# Patient Record
Sex: MALE | Race: OTHER | HISPANIC OR LATINO | URBAN - METROPOLITAN AREA
[De-identification: names, ages, dates, MRNs, and addresses within clinical notes are randomized per-mention and may not be internally consistent; named-entity substitution may affect disease eponyms.]

---

## 2018-01-08 ENCOUNTER — EMERGENCY (EMERGENCY)
Facility: HOSPITAL | Age: 43
LOS: 1 days | Discharge: HOME | End: 2018-01-08
Attending: EMERGENCY MEDICINE
Payer: MEDICAID

## 2018-01-08 VITALS
TEMPERATURE: 98 F | HEIGHT: 73 IN | RESPIRATION RATE: 16 BRPM | DIASTOLIC BLOOD PRESSURE: 101 MMHG | SYSTOLIC BLOOD PRESSURE: 183 MMHG | WEIGHT: 240.08 LBS | HEART RATE: 87 BPM | OXYGEN SATURATION: 99 %

## 2018-01-08 VITALS
OXYGEN SATURATION: 98 % | HEART RATE: 78 BPM | SYSTOLIC BLOOD PRESSURE: 170 MMHG | RESPIRATION RATE: 16 BRPM | DIASTOLIC BLOOD PRESSURE: 109 MMHG

## 2018-01-08 DIAGNOSIS — T14.90 INJURY, UNSPECIFIED: Chronic | ICD-10-CM

## 2018-01-08 LAB
ALBUMIN SERPL ELPH-MCNC: 4.1 G/DL — SIGNIFICANT CHANGE UP (ref 3.3–5.2)
ALP SERPL-CCNC: 71 U/L — SIGNIFICANT CHANGE UP (ref 40–120)
ALT FLD-CCNC: 24 U/L — SIGNIFICANT CHANGE UP
ANION GAP SERPL CALC-SCNC: 13 MMOL/L — SIGNIFICANT CHANGE UP (ref 5–17)
APPEARANCE UR: CLEAR — SIGNIFICANT CHANGE UP
AST SERPL-CCNC: 35 U/L — SIGNIFICANT CHANGE UP
BASOPHILS # BLD AUTO: 0 K/UL — SIGNIFICANT CHANGE UP (ref 0–0.2)
BASOPHILS NFR BLD AUTO: 0.4 % — SIGNIFICANT CHANGE UP (ref 0–2)
BILIRUB SERPL-MCNC: 0.8 MG/DL — SIGNIFICANT CHANGE UP (ref 0.4–2)
BILIRUB UR-MCNC: NEGATIVE — SIGNIFICANT CHANGE UP
BUN SERPL-MCNC: 13 MG/DL — SIGNIFICANT CHANGE UP (ref 8–20)
CALCIUM SERPL-MCNC: 9.3 MG/DL — SIGNIFICANT CHANGE UP (ref 8.6–10.2)
CHLORIDE SERPL-SCNC: 102 MMOL/L — SIGNIFICANT CHANGE UP (ref 98–107)
CO2 SERPL-SCNC: 25 MMOL/L — SIGNIFICANT CHANGE UP (ref 22–29)
COLOR SPEC: YELLOW — SIGNIFICANT CHANGE UP
CREAT SERPL-MCNC: 0.88 MG/DL — SIGNIFICANT CHANGE UP (ref 0.5–1.3)
D DIMER BLD IA.RAPID-MCNC: <150 NG/ML DDU — SIGNIFICANT CHANGE UP
DIFF PNL FLD: ABNORMAL
EOSINOPHIL # BLD AUTO: 0.1 K/UL — SIGNIFICANT CHANGE UP (ref 0–0.5)
EOSINOPHIL NFR BLD AUTO: 2.1 % — SIGNIFICANT CHANGE UP (ref 0–5)
GLUCOSE SERPL-MCNC: 109 MG/DL — SIGNIFICANT CHANGE UP (ref 70–115)
GLUCOSE UR QL: NEGATIVE MG/DL — SIGNIFICANT CHANGE UP
HCT VFR BLD CALC: 47 % — SIGNIFICANT CHANGE UP (ref 42–52)
HGB BLD-MCNC: 15.8 G/DL — SIGNIFICANT CHANGE UP (ref 14–18)
KETONES UR-MCNC: NEGATIVE — SIGNIFICANT CHANGE UP
LEUKOCYTE ESTERASE UR-ACNC: NEGATIVE — SIGNIFICANT CHANGE UP
LYMPHOCYTES # BLD AUTO: 2 K/UL — SIGNIFICANT CHANGE UP (ref 1–4.8)
LYMPHOCYTES # BLD AUTO: 30.3 % — SIGNIFICANT CHANGE UP (ref 20–55)
MCHC RBC-ENTMCNC: 31.2 PG — HIGH (ref 27–31)
MCHC RBC-ENTMCNC: 33.6 G/DL — SIGNIFICANT CHANGE UP (ref 32–36)
MCV RBC AUTO: 92.9 FL — SIGNIFICANT CHANGE UP (ref 80–94)
MONOCYTES # BLD AUTO: 0.5 K/UL — SIGNIFICANT CHANGE UP (ref 0–0.8)
MONOCYTES NFR BLD AUTO: 7.3 % — SIGNIFICANT CHANGE UP (ref 3–10)
NEUTROPHILS # BLD AUTO: 4 K/UL — SIGNIFICANT CHANGE UP (ref 1.8–8)
NEUTROPHILS NFR BLD AUTO: 59.8 % — SIGNIFICANT CHANGE UP (ref 37–73)
NITRITE UR-MCNC: NEGATIVE — SIGNIFICANT CHANGE UP
PH UR: 5 — SIGNIFICANT CHANGE UP (ref 5–8)
PLATELET # BLD AUTO: 260 K/UL — SIGNIFICANT CHANGE UP (ref 150–400)
POTASSIUM SERPL-MCNC: 5.5 MMOL/L — HIGH (ref 3.5–5.3)
POTASSIUM SERPL-SCNC: 5.5 MMOL/L — HIGH (ref 3.5–5.3)
PROT SERPL-MCNC: 8.2 G/DL — SIGNIFICANT CHANGE UP (ref 6.6–8.7)
PROT UR-MCNC: 15 MG/DL
RBC # BLD: 5.06 M/UL — SIGNIFICANT CHANGE UP (ref 4.6–6.2)
RBC # FLD: 13.2 % — SIGNIFICANT CHANGE UP (ref 11–15.6)
RBC CASTS # UR COMP ASSIST: SIGNIFICANT CHANGE UP /HPF (ref 0–4)
SODIUM SERPL-SCNC: 140 MMOL/L — SIGNIFICANT CHANGE UP (ref 135–145)
SP GR SPEC: 1.02 — SIGNIFICANT CHANGE UP (ref 1.01–1.02)
UROBILINOGEN FLD QL: NEGATIVE MG/DL — SIGNIFICANT CHANGE UP
WBC # BLD: 6.7 K/UL — SIGNIFICANT CHANGE UP (ref 4.8–10.8)
WBC # FLD AUTO: 6.7 K/UL — SIGNIFICANT CHANGE UP (ref 4.8–10.8)
WBC UR QL: SIGNIFICANT CHANGE UP

## 2018-01-08 PROCEDURE — 99284 EMERGENCY DEPT VISIT MOD MDM: CPT

## 2018-01-08 PROCEDURE — 85379 FIBRIN DEGRADATION QUANT: CPT

## 2018-01-08 PROCEDURE — 36415 COLL VENOUS BLD VENIPUNCTURE: CPT

## 2018-01-08 PROCEDURE — 81001 URINALYSIS AUTO W/SCOPE: CPT

## 2018-01-08 PROCEDURE — 85027 COMPLETE CBC AUTOMATED: CPT

## 2018-01-08 PROCEDURE — 99284 EMERGENCY DEPT VISIT MOD MDM: CPT | Mod: 25

## 2018-01-08 PROCEDURE — 96374 THER/PROPH/DIAG INJ IV PUSH: CPT

## 2018-01-08 PROCEDURE — 80053 COMPREHEN METABOLIC PANEL: CPT

## 2018-01-08 RX ORDER — LOSARTAN POTASSIUM 100 MG/1
25 TABLET, FILM COATED ORAL DAILY
Qty: 0 | Refills: 0 | Status: DISCONTINUED | OUTPATIENT
Start: 2018-01-08 | End: 2018-01-12

## 2018-01-08 RX ORDER — TRAMADOL HYDROCHLORIDE 50 MG/1
1 TABLET ORAL
Qty: 6 | Refills: 0
Start: 2018-01-08 | End: 2018-01-09

## 2018-01-08 RX ORDER — KETOROLAC TROMETHAMINE 30 MG/ML
30 SYRINGE (ML) INJECTION ONCE
Qty: 0 | Refills: 0 | Status: DISCONTINUED | OUTPATIENT
Start: 2018-01-08 | End: 2018-01-08

## 2018-01-08 RX ORDER — LOSARTAN POTASSIUM 100 MG/1
1 TABLET, FILM COATED ORAL
Qty: 30 | Refills: 0
Start: 2018-01-08 | End: 2018-02-06

## 2018-01-08 RX ORDER — IBUPROFEN 200 MG
1 TABLET ORAL
Qty: 30 | Refills: 0
Start: 2018-01-08 | End: 2018-01-17

## 2018-01-08 RX ADMIN — LOSARTAN POTASSIUM 25 MILLIGRAM(S): 100 TABLET, FILM COATED ORAL at 11:00

## 2018-01-08 RX ADMIN — Medication 30 MILLIGRAM(S): at 12:04

## 2018-01-08 NOTE — ED STATDOCS - PROGRESS NOTE DETAILS
Pt seen and evaluated. Agree with HPI/PE and plan. Results noted and findings d/w pt. Pt noncompliant with BP meds. Pt educated on importance of BP control and RX for losartan sent to pharmacy. NSAIDs, pain mgmt, RICE to leg and f/u PMD.

## 2018-01-08 NOTE — ED ADULT NURSE NOTE - OBJECTIVE STATEMENT
PT came in c.o b/l upper thigh pain radiating to perineum. PT c/o pain on BM,  PT denies melena, hematemesis.  PT denies fall, PT denies dysuria.

## 2018-01-08 NOTE — ED STATDOCS - ATTENDING CONTRIBUTION TO CARE
I, Sameer Amaral, performed the initial face to face bedside interview with this patient regarding history of present illness, review of symptoms and relevant past medical, social and family history.  I completed an independent physical examination.  I was the provider who initially evaluated this patient.  The history, relevant review of systems, past medical and surgical history, medical decision making, and physical examination was documented by the scribe in my presence and I attest to the accuracy of the documentation. Follow-up on ordered tests (ie labs, radiologic studies) and re-evaluation of the patient's status has been communicated to the ACP.  Disposition of the patient will be based on test outcome and response to ED interventions.

## 2018-01-08 NOTE — ED STATDOCS - OBJECTIVE STATEMENT
43 y/o M pt with a pmhx of cholecystectomy, HTN and hyperthyroidism presents to the ED c/o LLE pain and groin pain. Localizes pain from anterior thigh to the knee and perineum. Admits to pain when passing stool and occasional blood in stool. Describes the sensation as "pressure." He has had this issue in the past when he was admitted to the hospital with IV ABx for an infection. NKDA. Smoker, drinker, no illicit drug use. Denies fever, abdominal pain, n/v/d, hematuria, dysuria, pain when urination, sob or any other complaints. 41 y/o M pt with a pmhx of cholecystectomy, HTN and high cholesterol presents to the ED c/o LLE pain and groin pain x3 gallo. Localizes pain at the anterior thigh to the knee and the perineum. Admits to pain when passing stool and occasional blood in stool. Describes the sensation as "pressure." He has had this issue in the past when he was admitted to the hospital with IV ABx for an infection. NKDA. Smoker, drinker, no illicit drug use. Non compliant with Losartan Rx, stopped taking 1 month ago. Explains he was taking 25mg on losartan when prescribed. Denies fever, abdominal pain, n/v/d, hematuria, dysuria, pain when urination, sob or any other complaints. 43 y/o M pt with a pmhx of cholecystectomy, HTN and high cholesterol presents to the ED c/o LLE pain and groin pain x3 gallo. Localizes pain at the anterior thighs to the knees and the perineum. Slight discomfort with BMs and occasional blood in stool. Describes the sensation as "pressure." He has had this issue in the past when he was admitted to the hospital with IV ABx for an infection of prostate? NKDA. Smoker, drinker, no illicit drug use. Non compliant with Losartan Rx, stopped taking 1 month ago. Denies fever, abdominal pain, n/v/d, hematuria, dysuria, pain when urination, sob or any other complaints. 41 y/o M pt with a pmhx of cholecystectomy, HTN and high cholesterol presents to the ED c/o LLE pain and perineal pain x3 days. Reports discomfort in kentrell anterior thighs from hips to knees and "pressure-like" discomfort in perineum.  Denies scrotal pain or swelling.  Denies rectal pain.  "Slight" discomfort with BMs and occasional blood in stool. Describes the sensation as "pressure." He has had this issue in the past when he was admitted to the hospital with IV ABx for an infection of ?prostate? NKDA. Smoker, drinker, no illicit drug use. Non compliant with Losartan Rx, stopped taking 1 month ago; ran out of meds and refills. Denies fever, abdominal pain, n/v/d, hematuria, dysuria, pain when urination, sob or any other complaints.

## 2018-01-08 NOTE — ED STATDOCS - MEDICAL DECISION MAKING DETAILS
1. Possible prostatitis, will obtain labs.  2. HTN, will restart on losartan. 1. Nonspecific anterior thigh and perineal pain reportedly similar to symtpoms resulting in hospitalization 1 yr ago:  Possible prostatitis, will obtain labs.  2. HTN, will restart on losartan.

## 2018-01-08 NOTE — ED STATDOCS - ABDOMEN FINDINGS, MLM
nondistended/no rigidity, no guarding, no rebound. Rectal Exam: No obvious hemorrhoids, no perineal erythema or wounds. PROSTATE ENLARGEMENT. No prostate enlargement./TENDER

## 2018-01-08 NOTE — ED STATDOCS - CARE PLAN
Principal Discharge DX:	Pain of anterior lower extremity, unspecified laterality  Secondary Diagnosis:	Noncompliance with medication regimen Principal Discharge DX:	Pain of anterior lower extremity, unspecified laterality  Secondary Diagnosis:	Noncompliance with medication regimen  Secondary Diagnosis:	Hypertension, unspecified type

## 2018-01-08 NOTE — ED ADULT TRIAGE NOTE - CHIEF COMPLAINT QUOTE
pt presents to ED with b/l LE cramping x 3 days. pt c/o intermittent bloody stools x few months, pt states he was seen in ED  a few weeks ago, MD was told about bloody stools with negative findings. afebrile. denies use of anticoagulants. a&ox3

## 2018-01-09 LAB
C TRACH RRNA SPEC QL NAA+PROBE: SIGNIFICANT CHANGE UP
N GONORRHOEA RRNA SPEC QL NAA+PROBE: SIGNIFICANT CHANGE UP
SPECIMEN SOURCE: SIGNIFICANT CHANGE UP

## 2018-11-23 ENCOUNTER — EMERGENCY (EMERGENCY)
Facility: HOSPITAL | Age: 43
LOS: 1 days | Discharge: HOME | End: 2018-11-23
Attending: EMERGENCY MEDICINE
Payer: SELF-PAY

## 2018-11-23 VITALS — WEIGHT: 244.93 LBS | HEIGHT: 73 IN

## 2018-11-23 DIAGNOSIS — T14.90 INJURY, UNSPECIFIED: Chronic | ICD-10-CM

## 2018-11-23 LAB
ALBUMIN SERPL ELPH-MCNC: 4.3 G/DL — SIGNIFICANT CHANGE UP (ref 3.3–5.2)
ALP SERPL-CCNC: 74 U/L — SIGNIFICANT CHANGE UP (ref 40–120)
ALT FLD-CCNC: 29 U/L — SIGNIFICANT CHANGE UP
ANION GAP SERPL CALC-SCNC: 13 MMOL/L — SIGNIFICANT CHANGE UP (ref 5–17)
AST SERPL-CCNC: 22 U/L — SIGNIFICANT CHANGE UP
BASOPHILS # BLD AUTO: 0 K/UL — SIGNIFICANT CHANGE UP (ref 0–0.2)
BASOPHILS NFR BLD AUTO: 0.4 % — SIGNIFICANT CHANGE UP (ref 0–2)
BILIRUB SERPL-MCNC: 0.5 MG/DL — SIGNIFICANT CHANGE UP (ref 0.4–2)
BUN SERPL-MCNC: 16 MG/DL — SIGNIFICANT CHANGE UP (ref 8–20)
CALCIUM SERPL-MCNC: 9.6 MG/DL — SIGNIFICANT CHANGE UP (ref 8.6–10.2)
CHLORIDE SERPL-SCNC: 100 MMOL/L — SIGNIFICANT CHANGE UP (ref 98–107)
CO2 SERPL-SCNC: 25 MMOL/L — SIGNIFICANT CHANGE UP (ref 22–29)
CREAT SERPL-MCNC: 0.97 MG/DL — SIGNIFICANT CHANGE UP (ref 0.5–1.3)
EOSINOPHIL # BLD AUTO: 0.2 K/UL — SIGNIFICANT CHANGE UP (ref 0–0.5)
EOSINOPHIL NFR BLD AUTO: 2.6 % — SIGNIFICANT CHANGE UP (ref 0–5)
GLUCOSE SERPL-MCNC: 130 MG/DL — HIGH (ref 70–115)
HCT VFR BLD CALC: 46.1 % — SIGNIFICANT CHANGE UP (ref 42–52)
HGB BLD-MCNC: 16 G/DL — SIGNIFICANT CHANGE UP (ref 14–18)
LYMPHOCYTES # BLD AUTO: 3 K/UL — SIGNIFICANT CHANGE UP (ref 1–4.8)
LYMPHOCYTES # BLD AUTO: 32.8 % — SIGNIFICANT CHANGE UP (ref 20–55)
MCHC RBC-ENTMCNC: 31.6 PG — HIGH (ref 27–31)
MCHC RBC-ENTMCNC: 34.7 G/DL — SIGNIFICANT CHANGE UP (ref 32–36)
MCV RBC AUTO: 90.9 FL — SIGNIFICANT CHANGE UP (ref 80–94)
MONOCYTES # BLD AUTO: 0.5 K/UL — SIGNIFICANT CHANGE UP (ref 0–0.8)
MONOCYTES NFR BLD AUTO: 5.5 % — SIGNIFICANT CHANGE UP (ref 3–10)
NEUTROPHILS # BLD AUTO: 5.4 K/UL — SIGNIFICANT CHANGE UP (ref 1.8–8)
NEUTROPHILS NFR BLD AUTO: 58.5 % — SIGNIFICANT CHANGE UP (ref 37–73)
PLATELET # BLD AUTO: 311 K/UL — SIGNIFICANT CHANGE UP (ref 150–400)
POTASSIUM SERPL-MCNC: 4 MMOL/L — SIGNIFICANT CHANGE UP (ref 3.5–5.3)
POTASSIUM SERPL-SCNC: 4 MMOL/L — SIGNIFICANT CHANGE UP (ref 3.5–5.3)
PROT SERPL-MCNC: 7.9 G/DL — SIGNIFICANT CHANGE UP (ref 6.6–8.7)
RBC # BLD: 5.07 M/UL — SIGNIFICANT CHANGE UP (ref 4.6–6.2)
RBC # FLD: 13.2 % — SIGNIFICANT CHANGE UP (ref 11–15.6)
SODIUM SERPL-SCNC: 138 MMOL/L — SIGNIFICANT CHANGE UP (ref 135–145)
WBC # BLD: 9.3 K/UL — SIGNIFICANT CHANGE UP (ref 4.8–10.8)
WBC # FLD AUTO: 9.3 K/UL — SIGNIFICANT CHANGE UP (ref 4.8–10.8)

## 2018-11-23 PROCEDURE — 73130 X-RAY EXAM OF HAND: CPT | Mod: 26,RT

## 2018-11-23 PROCEDURE — 99284 EMERGENCY DEPT VISIT MOD MDM: CPT

## 2018-11-23 RX ORDER — AMLODIPINE BESYLATE 2.5 MG/1
5 TABLET ORAL ONCE
Qty: 0 | Refills: 0 | Status: COMPLETED | OUTPATIENT
Start: 2018-11-23 | End: 2018-11-23

## 2018-11-23 RX ORDER — MORPHINE SULFATE 50 MG/1
6 CAPSULE, EXTENDED RELEASE ORAL ONCE
Qty: 0 | Refills: 0 | Status: DISCONTINUED | OUTPATIENT
Start: 2018-11-23 | End: 2018-11-23

## 2018-11-23 RX ORDER — CEFAZOLIN SODIUM 1 G
2000 VIAL (EA) INJECTION ONCE
Qty: 0 | Refills: 0 | Status: COMPLETED | OUTPATIENT
Start: 2018-11-23 | End: 2018-11-23

## 2018-11-23 RX ORDER — LOSARTAN POTASSIUM 100 MG/1
50 TABLET, FILM COATED ORAL ONCE
Qty: 0 | Refills: 0 | Status: COMPLETED | OUTPATIENT
Start: 2018-11-23 | End: 2018-11-23

## 2018-11-23 RX ADMIN — Medication 2000 MILLIGRAM(S): at 21:50

## 2018-11-23 RX ADMIN — LOSARTAN POTASSIUM 50 MILLIGRAM(S): 100 TABLET, FILM COATED ORAL at 21:21

## 2018-11-23 RX ADMIN — AMLODIPINE BESYLATE 5 MILLIGRAM(S): 2.5 TABLET ORAL at 23:11

## 2018-11-23 RX ADMIN — Medication 100 MILLIGRAM(S): at 21:21

## 2018-11-23 RX ADMIN — LOSARTAN POTASSIUM 50 MILLIGRAM(S): 100 TABLET, FILM COATED ORAL at 23:11

## 2018-11-23 RX ADMIN — MORPHINE SULFATE 6 MILLIGRAM(S): 50 CAPSULE, EXTENDED RELEASE ORAL at 21:50

## 2018-11-23 RX ADMIN — MORPHINE SULFATE 6 MILLIGRAM(S): 50 CAPSULE, EXTENDED RELEASE ORAL at 21:20

## 2018-11-23 NOTE — ED ADULT NURSE NOTE - OBJECTIVE STATEMENT
Pt states he cut his hand on lumber 2 days ago at work, went to urgent care where they did an xray, sutured finger and gave a tetanus shot/abx, c/o pain to right first finger, wound clean and dry, reddened along laceration line, also states he ran out of his BP meds today and needs a refill, denies fever @ home

## 2018-11-23 NOTE — ED ADULT NURSE NOTE - CHIEF COMPLAINT QUOTE
c/o right 2nd finger injury, smashed in a machine at work,  went to urgent care 2 days ago, had stitches put in, now has pain, also needs a refill on bp meds

## 2018-11-23 NOTE — ED ADULT NURSE NOTE - NSIMPLEMENTINTERV_GEN_ALL_ED
Implemented All Universal Safety Interventions:  [place] to call system. Call bell, personal items and telephone within reach. Instruct patient to call for assistance. Room bathroom lighting operational. Non-slip footwear when patient is off stretcher. Physically safe environment: no spills, clutter or unnecessary equipment. Stretcher in lowest position, wheels locked, appropriate side rails in place.

## 2018-11-23 NOTE — ED ADULT TRIAGE NOTE - CHIEF COMPLAINT QUOTE
c/o right 2nd finger injury, smashed in a machine at work,  went to urgent care 2 days ago, had stitches put in, now has pain c/o right 2nd finger injury, smashed in a machine at work,  went to urgent care 2 days ago, had stitches put in, now has pain, also needs a refill on bp meds

## 2018-11-24 VITALS
RESPIRATION RATE: 18 BRPM | DIASTOLIC BLOOD PRESSURE: 86 MMHG | OXYGEN SATURATION: 100 % | SYSTOLIC BLOOD PRESSURE: 169 MMHG | HEART RATE: 77 BPM | TEMPERATURE: 98 F

## 2018-11-24 PROCEDURE — 85027 COMPLETE CBC AUTOMATED: CPT

## 2018-11-24 PROCEDURE — 96375 TX/PRO/DX INJ NEW DRUG ADDON: CPT

## 2018-11-24 PROCEDURE — 73130 X-RAY EXAM OF HAND: CPT

## 2018-11-24 PROCEDURE — 99284 EMERGENCY DEPT VISIT MOD MDM: CPT | Mod: 25

## 2018-11-24 PROCEDURE — 36415 COLL VENOUS BLD VENIPUNCTURE: CPT

## 2018-11-24 PROCEDURE — 80053 COMPREHEN METABOLIC PANEL: CPT

## 2018-11-24 PROCEDURE — 96365 THER/PROPH/DIAG IV INF INIT: CPT

## 2018-11-24 RX ORDER — HYDRALAZINE HCL 50 MG
25 TABLET ORAL ONCE
Qty: 0 | Refills: 0 | Status: DISCONTINUED | OUTPATIENT
Start: 2018-11-24 | End: 2018-11-24

## 2018-11-24 RX ORDER — IBUPROFEN 200 MG
1 TABLET ORAL
Qty: 28 | Refills: 0
Start: 2018-11-24 | End: 2018-11-30

## 2018-11-24 RX ORDER — OXYCODONE AND ACETAMINOPHEN 5; 325 MG/1; MG/1
1 TABLET ORAL ONCE
Qty: 0 | Refills: 0 | Status: DISCONTINUED | OUTPATIENT
Start: 2018-11-24 | End: 2018-11-24

## 2018-11-24 RX ORDER — CEPHALEXIN 500 MG
1 CAPSULE ORAL
Qty: 40 | Refills: 0
Start: 2018-11-24 | End: 2018-12-03

## 2018-11-24 RX ADMIN — OXYCODONE AND ACETAMINOPHEN 1 TABLET(S): 5; 325 TABLET ORAL at 00:48

## 2018-11-24 NOTE — ED PROVIDER NOTE - SKIN, MLM
Skin normal color for race, warm, dry and intact. No evidence of rash. 2 cm lac sutured to the 2nd R digit, no drainage, wound is intact

## 2018-11-24 NOTE — ED PROVIDER NOTE - OBJECTIVE STATEMENT
43 year old male with no significant PMH presents with finger pain. pt states that 2 days ago nicol fell onto his finger. He went to urgent care, had a lac sutured, neg XR and was discharged. He reports persistent throbbing pain to the finger, worse with movement. NO fever chills, wound discharge.  Of note, pt did not take his bp meds today. Denies chest pain, SOB, HA, blurry vision.

## 2018-11-24 NOTE — ED PROVIDER NOTE - UPPER EXTREMITY EXAM, RIGHT
2nd digiti with edema, full ROM but pain with RMO, no tenderness to flexor tendon sheath, no pain with passive extension, not holding in passive flexion

## 2018-11-24 NOTE — ED PROVIDER NOTE - MEDICAL DECISION MAKING DETAILS
Wound well healing, no sign of infection. Questionable fractuire on XR, finger splinted and advised f/u with hand. Stable for dc.  Pt with htn, may need meds adjusted, but as pain may have been a component advised him to f/u with his pmd for bp mgt as he was asymptomatic

## 2019-08-05 ENCOUNTER — INPATIENT (INPATIENT)
Facility: HOSPITAL | Age: 44
LOS: 1 days | Discharge: HOME | DRG: 392 | End: 2019-08-07
Attending: HOSPITALIST | Admitting: HOSPITALIST
Payer: MEDICAID

## 2019-08-05 VITALS
HEIGHT: 73 IN | OXYGEN SATURATION: 98 % | TEMPERATURE: 98 F | RESPIRATION RATE: 20 BRPM | SYSTOLIC BLOOD PRESSURE: 178 MMHG | HEART RATE: 80 BPM | DIASTOLIC BLOOD PRESSURE: 94 MMHG | WEIGHT: 244.93 LBS

## 2019-08-05 DIAGNOSIS — T14.90 INJURY, UNSPECIFIED: Chronic | ICD-10-CM

## 2019-08-05 DIAGNOSIS — K85.90 ACUTE PANCREATITIS WITHOUT NECROSIS OR INFECTION, UNSPECIFIED: ICD-10-CM

## 2019-08-05 DIAGNOSIS — Z90.49 ACQUIRED ABSENCE OF OTHER SPECIFIED PARTS OF DIGESTIVE TRACT: Chronic | ICD-10-CM

## 2019-08-05 LAB
ALBUMIN SERPL ELPH-MCNC: 4.6 G/DL — SIGNIFICANT CHANGE UP (ref 3.3–5.2)
ALP SERPL-CCNC: 73 U/L — SIGNIFICANT CHANGE UP (ref 40–120)
ALT FLD-CCNC: 28 U/L — SIGNIFICANT CHANGE UP
ANION GAP SERPL CALC-SCNC: 10 MMOL/L — SIGNIFICANT CHANGE UP (ref 5–17)
APPEARANCE UR: CLEAR — SIGNIFICANT CHANGE UP
AST SERPL-CCNC: 24 U/L — SIGNIFICANT CHANGE UP
BASOPHILS # BLD AUTO: 0.05 K/UL — SIGNIFICANT CHANGE UP (ref 0–0.2)
BASOPHILS NFR BLD AUTO: 0.6 % — SIGNIFICANT CHANGE UP (ref 0–2)
BILIRUB SERPL-MCNC: 0.5 MG/DL — SIGNIFICANT CHANGE UP (ref 0.4–2)
BILIRUB UR-MCNC: NEGATIVE — SIGNIFICANT CHANGE UP
BUN SERPL-MCNC: 13 MG/DL — SIGNIFICANT CHANGE UP (ref 8–20)
CALCIUM SERPL-MCNC: 9.1 MG/DL — SIGNIFICANT CHANGE UP (ref 8.6–10.2)
CHLORIDE SERPL-SCNC: 103 MMOL/L — SIGNIFICANT CHANGE UP (ref 98–107)
CO2 SERPL-SCNC: 24 MMOL/L — SIGNIFICANT CHANGE UP (ref 22–29)
COLOR SPEC: YELLOW — SIGNIFICANT CHANGE UP
CREAT SERPL-MCNC: 0.98 MG/DL — SIGNIFICANT CHANGE UP (ref 0.5–1.3)
DIFF PNL FLD: NEGATIVE — SIGNIFICANT CHANGE UP
EOSINOPHIL # BLD AUTO: 0.13 K/UL — SIGNIFICANT CHANGE UP (ref 0–0.5)
EOSINOPHIL NFR BLD AUTO: 1.6 % — SIGNIFICANT CHANGE UP (ref 0–6)
EPI CELLS # UR: SIGNIFICANT CHANGE UP
GLUCOSE SERPL-MCNC: 103 MG/DL — SIGNIFICANT CHANGE UP (ref 70–115)
GLUCOSE UR QL: NEGATIVE MG/DL — SIGNIFICANT CHANGE UP
HCT VFR BLD CALC: 46.2 % — SIGNIFICANT CHANGE UP (ref 39–50)
HGB BLD-MCNC: 15.4 G/DL — SIGNIFICANT CHANGE UP (ref 13–17)
IMM GRANULOCYTES NFR BLD AUTO: 0.2 % — SIGNIFICANT CHANGE UP (ref 0–1.5)
KETONES UR-MCNC: NEGATIVE — SIGNIFICANT CHANGE UP
LEUKOCYTE ESTERASE UR-ACNC: ABNORMAL
LIDOCAIN IGE QN: 704 U/L — HIGH (ref 22–51)
LYMPHOCYTES # BLD AUTO: 2.38 K/UL — SIGNIFICANT CHANGE UP (ref 1–3.3)
LYMPHOCYTES # BLD AUTO: 29.6 % — SIGNIFICANT CHANGE UP (ref 13–44)
MCHC RBC-ENTMCNC: 30.9 PG — SIGNIFICANT CHANGE UP (ref 27–34)
MCHC RBC-ENTMCNC: 33.3 GM/DL — SIGNIFICANT CHANGE UP (ref 32–36)
MCV RBC AUTO: 92.6 FL — SIGNIFICANT CHANGE UP (ref 80–100)
MONOCYTES # BLD AUTO: 0.48 K/UL — SIGNIFICANT CHANGE UP (ref 0–0.9)
MONOCYTES NFR BLD AUTO: 6 % — SIGNIFICANT CHANGE UP (ref 2–14)
NEUTROPHILS # BLD AUTO: 4.97 K/UL — SIGNIFICANT CHANGE UP (ref 1.8–7.4)
NEUTROPHILS NFR BLD AUTO: 62 % — SIGNIFICANT CHANGE UP (ref 43–77)
NITRITE UR-MCNC: NEGATIVE — SIGNIFICANT CHANGE UP
OB PNL STL: NEGATIVE — SIGNIFICANT CHANGE UP
PH UR: 6.5 — SIGNIFICANT CHANGE UP (ref 5–8)
PLATELET # BLD AUTO: 324 K/UL — SIGNIFICANT CHANGE UP (ref 150–400)
POTASSIUM SERPL-MCNC: 4.3 MMOL/L — SIGNIFICANT CHANGE UP (ref 3.5–5.3)
POTASSIUM SERPL-SCNC: 4.3 MMOL/L — SIGNIFICANT CHANGE UP (ref 3.5–5.3)
PROT SERPL-MCNC: 7.7 G/DL — SIGNIFICANT CHANGE UP (ref 6.6–8.7)
PROT UR-MCNC: NEGATIVE MG/DL — SIGNIFICANT CHANGE UP
RBC # BLD: 4.99 M/UL — SIGNIFICANT CHANGE UP (ref 4.2–5.8)
RBC # FLD: 12.9 % — SIGNIFICANT CHANGE UP (ref 10.3–14.5)
RBC CASTS # UR COMP ASSIST: SIGNIFICANT CHANGE UP /HPF (ref 0–4)
SODIUM SERPL-SCNC: 137 MMOL/L — SIGNIFICANT CHANGE UP (ref 135–145)
SP GR SPEC: 1.01 — SIGNIFICANT CHANGE UP (ref 1.01–1.02)
UROBILINOGEN FLD QL: NEGATIVE MG/DL — SIGNIFICANT CHANGE UP
WBC # BLD: 8.03 K/UL — SIGNIFICANT CHANGE UP (ref 3.8–10.5)
WBC # FLD AUTO: 8.03 K/UL — SIGNIFICANT CHANGE UP (ref 3.8–10.5)
WBC UR QL: ABNORMAL

## 2019-08-05 PROCEDURE — 99285 EMERGENCY DEPT VISIT HI MDM: CPT

## 2019-08-05 PROCEDURE — 74176 CT ABD & PELVIS W/O CONTRAST: CPT | Mod: 26

## 2019-08-05 PROCEDURE — 99223 1ST HOSP IP/OBS HIGH 75: CPT

## 2019-08-05 RX ORDER — LOSARTAN/HYDROCHLOROTHIAZIDE 100MG-25MG
1 TABLET ORAL
Qty: 0 | Refills: 0 | DISCHARGE

## 2019-08-05 RX ORDER — LOSARTAN POTASSIUM 100 MG/1
50 TABLET, FILM COATED ORAL DAILY
Refills: 0 | Status: DISCONTINUED | OUTPATIENT
Start: 2019-08-05 | End: 2019-08-07

## 2019-08-05 RX ORDER — MORPHINE SULFATE 50 MG/1
4 CAPSULE, EXTENDED RELEASE ORAL EVERY 4 HOURS
Refills: 0 | Status: DISCONTINUED | OUTPATIENT
Start: 2019-08-05 | End: 2019-08-07

## 2019-08-05 RX ORDER — ONDANSETRON 8 MG/1
4 TABLET, FILM COATED ORAL EVERY 6 HOURS
Refills: 0 | Status: DISCONTINUED | OUTPATIENT
Start: 2019-08-05 | End: 2019-08-07

## 2019-08-05 RX ORDER — PANTOPRAZOLE SODIUM 20 MG/1
40 TABLET, DELAYED RELEASE ORAL DAILY
Refills: 0 | Status: DISCONTINUED | OUTPATIENT
Start: 2019-08-05 | End: 2019-08-07

## 2019-08-05 RX ORDER — SODIUM CHLORIDE 9 MG/ML
1000 INJECTION INTRAMUSCULAR; INTRAVENOUS; SUBCUTANEOUS
Refills: 0 | Status: DISCONTINUED | OUTPATIENT
Start: 2019-08-05 | End: 2019-08-07

## 2019-08-05 RX ORDER — METRONIDAZOLE 500 MG
500 TABLET ORAL EVERY 8 HOURS
Refills: 0 | Status: DISCONTINUED | OUTPATIENT
Start: 2019-08-05 | End: 2019-08-07

## 2019-08-05 RX ADMIN — MORPHINE SULFATE 4 MILLIGRAM(S): 50 CAPSULE, EXTENDED RELEASE ORAL at 18:15

## 2019-08-05 RX ADMIN — Medication 10 MILLIGRAM(S): at 11:06

## 2019-08-05 RX ADMIN — MORPHINE SULFATE 4 MILLIGRAM(S): 50 CAPSULE, EXTENDED RELEASE ORAL at 13:27

## 2019-08-05 RX ADMIN — MORPHINE SULFATE 4 MILLIGRAM(S): 50 CAPSULE, EXTENDED RELEASE ORAL at 21:50

## 2019-08-05 RX ADMIN — PANTOPRAZOLE SODIUM 40 MILLIGRAM(S): 20 TABLET, DELAYED RELEASE ORAL at 15:45

## 2019-08-05 RX ADMIN — MORPHINE SULFATE 4 MILLIGRAM(S): 50 CAPSULE, EXTENDED RELEASE ORAL at 17:35

## 2019-08-05 RX ADMIN — LOSARTAN POTASSIUM 50 MILLIGRAM(S): 100 TABLET, FILM COATED ORAL at 11:06

## 2019-08-05 RX ADMIN — SODIUM CHLORIDE 150 MILLILITER(S): 9 INJECTION INTRAMUSCULAR; INTRAVENOUS; SUBCUTANEOUS at 13:28

## 2019-08-05 RX ADMIN — SODIUM CHLORIDE 150 MILLILITER(S): 9 INJECTION INTRAMUSCULAR; INTRAVENOUS; SUBCUTANEOUS at 17:36

## 2019-08-05 RX ADMIN — Medication 30 MILLILITER(S): at 17:36

## 2019-08-05 RX ADMIN — MORPHINE SULFATE 4 MILLIGRAM(S): 50 CAPSULE, EXTENDED RELEASE ORAL at 22:05

## 2019-08-05 RX ADMIN — Medication 100 MILLIGRAM(S): at 21:47
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
## 2019-08-05 NOTE — ED STATDOCS - CARE PLAN
Principal Discharge DX:	Acute pancreatitis, unspecified complication status, unspecified pancreatitis type  Secondary Diagnosis:	Hypertension, unspecified type  Secondary Diagnosis:	Noncompliance with medication regimen

## 2019-08-05 NOTE — H&P ADULT - HISTORY OF PRESENT ILLNESS
45 yr old male w HLD, HTN came to ED c/o abdominal pain and blood per rectum    Pt states the abdominal pain started 1 weeks ago.  Intermittent pain 7-8/10, lasting approximately 1 hr/  Not sure what increases or decreases the pain  Has had pain w radiation to his back  No travel hx; no sick contacts  + nausea but was able to eat his usual diet over the past week  + watery stool w blood 2 times daily    In ED  /94 HR 80  Exam remarkable for LLQ tenderness, no CVAT   Labs w lipase 702.  morphine 4 mg IV. 600 cc IVF.  Rectal was guaiac neg brown stool.  Ct result- shows colitis

## 2019-08-05 NOTE — ED STATDOCS - PROGRESS NOTE DETAILS
labs reviewed: elevated lipase.   Patient reports occasional beer consumption but hasn't drnk in over a week.  Reports ongoing discomfort in epigastrium.  Will admit for pancreatitis.  case endorsed to telehospitalist.

## 2019-08-05 NOTE — H&P ADULT - ASSESSMENT
45 yr old male w HLD, HTN came to ED c/o abdominal pain and blood per rectum    #Acute abdominal pain- colitis- levaquin flagyl   # elevated lipase  #Blood per rectum FOBT neg x 1    1. medical floor   2. clear liquid diet  3. continue IVF  4. trend CBC  5. CT results pending  6. protonix 40 mg IV  7. will consider GI in am      #HLD  does not have script for statin  1. check lipids      #HTN-1. losartan        IMPROVE VTE Individual Risk Assessment    RISK                                                                Points    [  ] Previous VTE                                                  3    [  ] Thrombophilia                                               2    [  ] Lower limb paralysis                                      2        (unable to hold up >15 seconds)      [  ] Current Cancer                                              2         (within 6 months)

## 2019-08-05 NOTE — CONSULT NOTE ADULT - ASSESSMENT
45 yr old male w HLD, HTN came to ED c/o abdominal pain and blood per rectum    #Acute abdominal pain  # elevated lipase  #Blood per rectum FOBT neg x 1    1. admit to med  2. NPO  3. continue IVF  4. trend CBC  5. CT results pending  6. protonix 40 mg IV  7. consider GI      #HLD  does not have script for statin  1. check lipids      #HTN  1. losartan        IMPROVE VTE Individual Risk Assessment    RISK                                                                Points    [  ] Previous VTE                                                  3    [  ] Thrombophilia                                               2    [  ] Lower limb paralysis                                      2        (unable to hold up >15 seconds)      [  ] Current Cancer                                              2         (within 6 months)    [  ] Immobilization > 24 hrs                                1    [  ] ICU/CCU stay > 24 hours                              1    [  ] Age > 60                                                      1    IMPROVE VTE Score __0_______    IMPROVE Score 0-1: Low Risk, No VTE prophylaxis required for most patients, encourage ambulation.   IMPROVE Score 2-3: At risk, pharmacologic VTE prophylaxis is indicated for most patients (in the absence of a contraindication)  IMPROVE Score > or = 4: High Risk, pharmacologic VTE prophylaxis is indicated for most patients (in the absence of a contraindication)
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
## 2019-08-05 NOTE — ED ADULT NURSE NOTE - PAIN RATING/NUMBER SCALE (0-10): ACTIVITY
NOTIFICATION RETURN TO WORK / SCHOOL 
 
1/30/2018 11:30 AM 
 
Mr. Andre Godinez [address] [facility] [address] To Whom It May Concern: 
 
Andre Godinez is currently under the care of Jameel Garcia [address]. He will return to work/school on: when symptom free for 24 hours. If there are questions or concerns please have the patient contact our office. Sincerely, Yessica Plasencia LPN 
 
                                
 
 8

## 2019-08-05 NOTE — ED STATDOCS - OBJECTIVE STATEMENT
45 y/o M pt with significant PMHx of HTN, HLD presents to the ED c/o bloody stool, onset 1 week ago. Associated sx of nausea, lower back pain and gas pressure in abdomen. Patient describes his stool as watery. Patient has had 2 episodes per day of bowel movements that have been watery and bloody. He reports that he uses Losartan HCTZ for his HTN. Current smoker. Allergy to shellfish. Denies fever, vomiting, recent travel, current antibiotic use, and no recent exposure to public hilliard. No further acute complaints at this time.  SHx: Cholecystectomy 45 y/o M pt with significant PMHx of HTN, HLD presents to the ED c/o bloody stool, onset 1 week ago. Associated sx of nausea, lower back pain and gas pressure in abdomen. Patient describes his stool as watery. Patient has had 2 episodes per day of bowel movements that have been watery and bloody. H/O HTN:  prescribed Losartan HCTZ, hasn't taken dose today. Current smoker. Allergy to shellfish. Denies fever, vomiting, recent travel, current antibiotic use, and no recent exposure to public hilliard. No further acute complaints at this time.  SHx: Cholecystectomy

## 2019-08-05 NOTE — H&P ADULT - NSHPPHYSICALEXAM_GEN_ALL_CORE
GENERAL: NAD,   HEAD:  Atraumatic, Normocephalic  EYES: EOMI, PERRL  NECK: Supple, No JVD, Normal thyroid  NERVOUS SYSTEM:  Alert & Oriented X3, Good concentration; Moves upper and lower extremities;   CHEST/LUNG: Clear to percussion bilaterally; No rales, rhonchi, wheezing, or rubs  HEART: Regular rate and rhythm; No murmurs, rubs, or gallops  ABDOMEN: Soft, tender, Nondistended; Bowel sounds present  EXTREMITIES:  2+ Peripheral Pulses, No clubbing, cyanosis, or edema

## 2019-08-06 LAB
ANION GAP SERPL CALC-SCNC: 7 MMOL/L — SIGNIFICANT CHANGE UP (ref 5–17)
BASOPHILS # BLD AUTO: 0.04 K/UL — SIGNIFICANT CHANGE UP (ref 0–0.2)
BASOPHILS NFR BLD AUTO: 0.5 % — SIGNIFICANT CHANGE UP (ref 0–2)
BUN SERPL-MCNC: 11 MG/DL — SIGNIFICANT CHANGE UP (ref 8–20)
CALCIUM SERPL-MCNC: 8.2 MG/DL — LOW (ref 8.6–10.2)
CHLORIDE SERPL-SCNC: 106 MMOL/L — SIGNIFICANT CHANGE UP (ref 98–107)
CHOLEST SERPL-MCNC: 178 MG/DL — SIGNIFICANT CHANGE UP (ref 110–199)
CO2 SERPL-SCNC: 25 MMOL/L — SIGNIFICANT CHANGE UP (ref 22–29)
CREAT SERPL-MCNC: 0.85 MG/DL — SIGNIFICANT CHANGE UP (ref 0.5–1.3)
EOSINOPHIL # BLD AUTO: 0.15 K/UL — SIGNIFICANT CHANGE UP (ref 0–0.5)
EOSINOPHIL NFR BLD AUTO: 2.1 % — SIGNIFICANT CHANGE UP (ref 0–6)
GLUCOSE SERPL-MCNC: 105 MG/DL — SIGNIFICANT CHANGE UP (ref 70–115)
HCT VFR BLD CALC: 43.4 % — SIGNIFICANT CHANGE UP (ref 39–50)
HDLC SERPL-MCNC: 31 MG/DL — LOW
HGB BLD-MCNC: 14 G/DL — SIGNIFICANT CHANGE UP (ref 13–17)
IMM GRANULOCYTES NFR BLD AUTO: 0.3 % — SIGNIFICANT CHANGE UP (ref 0–1.5)
LIDOCAIN IGE QN: 55 U/L — HIGH (ref 22–51)
LIPID PNL WITH DIRECT LDL SERPL: 117 MG/DL — SIGNIFICANT CHANGE UP
LYMPHOCYTES # BLD AUTO: 2.53 K/UL — SIGNIFICANT CHANGE UP (ref 1–3.3)
LYMPHOCYTES # BLD AUTO: 34.8 % — SIGNIFICANT CHANGE UP (ref 13–44)
MCHC RBC-ENTMCNC: 30.2 PG — SIGNIFICANT CHANGE UP (ref 27–34)
MCHC RBC-ENTMCNC: 32.3 GM/DL — SIGNIFICANT CHANGE UP (ref 32–36)
MCV RBC AUTO: 93.7 FL — SIGNIFICANT CHANGE UP (ref 80–100)
MONOCYTES # BLD AUTO: 0.53 K/UL — SIGNIFICANT CHANGE UP (ref 0–0.9)
MONOCYTES NFR BLD AUTO: 7.3 % — SIGNIFICANT CHANGE UP (ref 2–14)
NEUTROPHILS # BLD AUTO: 4.01 K/UL — SIGNIFICANT CHANGE UP (ref 1.8–7.4)
NEUTROPHILS NFR BLD AUTO: 55 % — SIGNIFICANT CHANGE UP (ref 43–77)
PLATELET # BLD AUTO: 277 K/UL — SIGNIFICANT CHANGE UP (ref 150–400)
POTASSIUM SERPL-MCNC: 4.2 MMOL/L — SIGNIFICANT CHANGE UP (ref 3.5–5.3)
POTASSIUM SERPL-SCNC: 4.2 MMOL/L — SIGNIFICANT CHANGE UP (ref 3.5–5.3)
RBC # BLD: 4.63 M/UL — SIGNIFICANT CHANGE UP (ref 4.2–5.8)
RBC # FLD: 13.1 % — SIGNIFICANT CHANGE UP (ref 10.3–14.5)
SODIUM SERPL-SCNC: 138 MMOL/L — SIGNIFICANT CHANGE UP (ref 135–145)
TOTAL CHOLESTEROL/HDL RATIO MEASUREMENT: 6 RATIO — SIGNIFICANT CHANGE UP (ref 3.4–9.6)
TRIGL SERPL-MCNC: 151 MG/DL — SIGNIFICANT CHANGE UP (ref 10–200)
WBC # BLD: 7.28 K/UL — SIGNIFICANT CHANGE UP (ref 3.8–10.5)
WBC # FLD AUTO: 7.28 K/UL — SIGNIFICANT CHANGE UP (ref 3.8–10.5)

## 2019-08-06 PROCEDURE — 99233 SBSQ HOSP IP/OBS HIGH 50: CPT

## 2019-08-06 RX ORDER — LANOLIN ALCOHOL/MO/W.PET/CERES
5 CREAM (GRAM) TOPICAL ONCE
Refills: 0 | Status: COMPLETED | OUTPATIENT
Start: 2019-08-06 | End: 2019-08-06

## 2019-08-06 RX ORDER — HYDRALAZINE HCL 50 MG
5 TABLET ORAL ONCE
Refills: 0 | Status: COMPLETED | OUTPATIENT
Start: 2019-08-06 | End: 2019-08-06

## 2019-08-06 RX ADMIN — Medication 100 MILLIGRAM(S): at 13:13

## 2019-08-06 RX ADMIN — SODIUM CHLORIDE 150 MILLILITER(S): 9 INJECTION INTRAMUSCULAR; INTRAVENOUS; SUBCUTANEOUS at 16:45

## 2019-08-06 RX ADMIN — SODIUM CHLORIDE 150 MILLILITER(S): 9 INJECTION INTRAMUSCULAR; INTRAVENOUS; SUBCUTANEOUS at 08:43

## 2019-08-06 RX ADMIN — Medication 100 MILLIGRAM(S): at 05:14

## 2019-08-06 RX ADMIN — Medication 100 MILLIGRAM(S): at 22:42

## 2019-08-06 RX ADMIN — PANTOPRAZOLE SODIUM 40 MILLIGRAM(S): 20 TABLET, DELAYED RELEASE ORAL at 12:03

## 2019-08-06 RX ADMIN — Medication 5 MILLIGRAM(S): at 22:42

## 2019-08-06 RX ADMIN — LOSARTAN POTASSIUM 50 MILLIGRAM(S): 100 TABLET, FILM COATED ORAL at 06:11
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
## 2019-08-06 NOTE — CHART NOTE - NSCHARTNOTEFT_GEN_A_CORE
PA NOTE-MED     Called by Rn due to Increase in Pt BP over baseline 178/92  Pt denies Pain/Anxiety  not due for Cozaar until AM   RX'd hydraLAZINE 5MG ivp X 1 NOW   repeat Bp Manually in 1 Hour  call PA if no improvement

## 2019-08-07 ENCOUNTER — TRANSCRIPTION ENCOUNTER (OUTPATIENT)
Age: 44
End: 2019-08-07

## 2019-08-07 VITALS — HEART RATE: 64 BPM | RESPIRATION RATE: 20 BRPM | TEMPERATURE: 98 F | OXYGEN SATURATION: 98 %

## 2019-08-07 PROCEDURE — 99239 HOSP IP/OBS DSCHRG MGMT >30: CPT

## 2019-08-07 RX ORDER — METRONIDAZOLE 500 MG
1 TABLET ORAL
Qty: 24 | Refills: 0
Start: 2019-08-07 | End: 2019-08-14

## 2019-08-07 RX ADMIN — Medication 100 MILLIGRAM(S): at 06:24

## 2019-08-07 RX ADMIN — PANTOPRAZOLE SODIUM 40 MILLIGRAM(S): 20 TABLET, DELAYED RELEASE ORAL at 11:21

## 2019-08-07 RX ADMIN — SODIUM CHLORIDE 150 MILLILITER(S): 9 INJECTION INTRAMUSCULAR; INTRAVENOUS; SUBCUTANEOUS at 03:45

## 2019-08-07 RX ADMIN — LOSARTAN POTASSIUM 50 MILLIGRAM(S): 100 TABLET, FILM COATED ORAL at 06:24

## 2019-08-07 NOTE — DISCHARGE NOTE PROVIDER - CARE PROVIDER_API CALL
Jose Miguel Márquez)  Gastroenterology; Internal Medicine  [address] [place] [address]  Phone: [phone]  Fax: [phone]  Follow Up Time:

## 2019-08-07 NOTE — DISCHARGE NOTE PROVIDER - NSDCCPCAREPLAN_GEN_ALL_CORE_FT
PRINCIPAL DISCHARGE DIAGNOSIS  Diagnosis: Acute colitis  Assessment and Plan of Treatment: [facility] antibiotics and follow up with GI out patient      SECONDARY DISCHARGE DIAGNOSES  Diagnosis: Noncompliance with medication regimen  Assessment and Plan of Treatment:     Diagnosis: Hypertension, unspecified type  Assessment and Plan of Treatment:

## 2019-08-07 NOTE — DISCHARGE NOTE PROVIDER - HOSPITAL COURSE
45 yr old male w HLD, HTN came to ED c/o abdominal pain and blood per rectum- resolved no diarrhea or bloody stool since being in ED         #Acute abdominal pain- colitis- Levaquin flagyl     tolerating diet    out patietn follow up with GI in am        #HLD     statin    #HTN-1. losartan hctz 45 yr old male w HLD, HTN came to ED c/o abdominal pain and blood per rectum- resolved no diarrhea or bloody stool since being in ED         #Acute abdominal pain- colitis- Levaquin flagyl     tolerating diet    out patietn follow up with GI in am        #HLD     statin    #HTN-1. losartan hctz            GENERAL: NAD, well-groomed, well-developed, VSS    HEAD:  Atraumatic, Normocephalic    EYES: EOMI, conjunctiva and sclera clear    ENMT: No tonsillar erythema, exudates, or enlargement; Moist mucous membranes,     NERVOUS SYSTEM:  Alert & Oriented X3,Motor Strength 5/5 B/L upper and lower extremities    CHEST/LUNG: Clear to percussion bilaterally; No rales, rhonchi, wheezing, or rubs    HEART: Regular rate and rhythm; No murmurs, rubs, or gallops    ABDOMEN: Soft, Nontender, Nondistended; Bowel sounds present    EXTREMITIES:  2+ Peripheral Pulses, No clubbing, cyanosis, or edema

## 2019-08-07 NOTE — SBIRT NOTE ADULT - NSSBIRTALCPOSREINDET_GEN_A_CORE
As per pt he only drinks when his brother is visiting which is 2-3 times per month. Pt not interested in substance abuse treatment reports he will refrain from drinking on his own. Resources declined.

## 2019-08-07 NOTE — DISCHARGE NOTE NURSING/CASE MANAGEMENT/SOCIAL WORK - NSDCDPATPORTLINK_GEN_ALL_CORE
You can access the [facility] Patient Portal, offered by [facility], by registering with the following website: [email or web address]

## 2019-08-10 LAB
CULTURE RESULTS: SIGNIFICANT CHANGE UP
SPECIMEN SOURCE: SIGNIFICANT CHANGE UP

## 2019-10-31 PROCEDURE — 80048 BASIC METABOLIC PNL TOTAL CA: CPT

## 2019-10-31 PROCEDURE — 99285 EMERGENCY DEPT VISIT HI MDM: CPT

## 2019-10-31 PROCEDURE — 87040 BLOOD CULTURE FOR BACTERIA: CPT

## 2019-10-31 PROCEDURE — 83690 ASSAY OF LIPASE: CPT

## 2019-10-31 PROCEDURE — 80061 LIPID PANEL: CPT

## 2019-10-31 PROCEDURE — 36415 COLL VENOUS BLD VENIPUNCTURE: CPT

## 2019-10-31 PROCEDURE — 82272 OCCULT BLD FECES 1-3 TESTS: CPT

## 2019-10-31 PROCEDURE — 85027 COMPLETE CBC AUTOMATED: CPT

## 2019-10-31 PROCEDURE — 80053 COMPREHEN METABOLIC PANEL: CPT

## 2019-10-31 PROCEDURE — 81001 URINALYSIS AUTO W/SCOPE: CPT

## 2019-10-31 PROCEDURE — 74176 CT ABD & PELVIS W/O CONTRAST: CPT

## 2019-12-10 ENCOUNTER — EMERGENCY (EMERGENCY)
Facility: HOSPITAL | Age: 44
LOS: 1 days | End: 2019-12-10
Attending: EMERGENCY MEDICINE
Payer: MEDICAID

## 2019-12-10 VITALS
OXYGEN SATURATION: 98 % | DIASTOLIC BLOOD PRESSURE: 74 MMHG | HEART RATE: 75 BPM | TEMPERATURE: 99 F | SYSTOLIC BLOOD PRESSURE: 152 MMHG | RESPIRATION RATE: 18 BRPM

## 2019-12-10 VITALS
HEART RATE: 99 BPM | SYSTOLIC BLOOD PRESSURE: 165 MMHG | DIASTOLIC BLOOD PRESSURE: 93 MMHG | OXYGEN SATURATION: 96 % | HEIGHT: 73 IN | TEMPERATURE: 98 F | WEIGHT: 240.08 LBS | RESPIRATION RATE: 18 BRPM

## 2019-12-10 DIAGNOSIS — Z90.49 ACQUIRED ABSENCE OF OTHER SPECIFIED PARTS OF DIGESTIVE TRACT: Chronic | ICD-10-CM

## 2019-12-10 DIAGNOSIS — T14.90 INJURY, UNSPECIFIED: Chronic | ICD-10-CM

## 2019-12-10 LAB
ALBUMIN SERPL ELPH-MCNC: 4.3 G/DL — SIGNIFICANT CHANGE UP (ref 3.3–5.2)
ALP SERPL-CCNC: 66 U/L — SIGNIFICANT CHANGE UP (ref 40–120)
ALT FLD-CCNC: 21 U/L — SIGNIFICANT CHANGE UP
ANION GAP SERPL CALC-SCNC: 12 MMOL/L — SIGNIFICANT CHANGE UP (ref 5–17)
AST SERPL-CCNC: 18 U/L — SIGNIFICANT CHANGE UP
BASOPHILS # BLD AUTO: 0.06 K/UL — SIGNIFICANT CHANGE UP (ref 0–0.2)
BASOPHILS NFR BLD AUTO: 0.7 % — SIGNIFICANT CHANGE UP (ref 0–2)
BILIRUB SERPL-MCNC: 0.7 MG/DL — SIGNIFICANT CHANGE UP (ref 0.4–2)
BUN SERPL-MCNC: 15 MG/DL — SIGNIFICANT CHANGE UP (ref 8–20)
CALCIUM SERPL-MCNC: 9.5 MG/DL — SIGNIFICANT CHANGE UP (ref 8.6–10.2)
CHLORIDE SERPL-SCNC: 102 MMOL/L — SIGNIFICANT CHANGE UP (ref 98–107)
CO2 SERPL-SCNC: 23 MMOL/L — SIGNIFICANT CHANGE UP (ref 22–29)
CREAT SERPL-MCNC: 0.87 MG/DL — SIGNIFICANT CHANGE UP (ref 0.5–1.3)
EOSINOPHIL # BLD AUTO: 0.37 K/UL — SIGNIFICANT CHANGE UP (ref 0–0.5)
EOSINOPHIL NFR BLD AUTO: 4.2 % — SIGNIFICANT CHANGE UP (ref 0–6)
GLUCOSE SERPL-MCNC: 134 MG/DL — HIGH (ref 70–115)
HCT VFR BLD CALC: 47.9 % — SIGNIFICANT CHANGE UP (ref 39–50)
HGB BLD-MCNC: 15.6 G/DL — SIGNIFICANT CHANGE UP (ref 13–17)
IMM GRANULOCYTES NFR BLD AUTO: 0.2 % — SIGNIFICANT CHANGE UP (ref 0–1.5)
LYMPHOCYTES # BLD AUTO: 2.31 K/UL — SIGNIFICANT CHANGE UP (ref 1–3.3)
LYMPHOCYTES # BLD AUTO: 26.3 % — SIGNIFICANT CHANGE UP (ref 13–44)
MCHC RBC-ENTMCNC: 30.2 PG — SIGNIFICANT CHANGE UP (ref 27–34)
MCHC RBC-ENTMCNC: 32.6 GM/DL — SIGNIFICANT CHANGE UP (ref 32–36)
MCV RBC AUTO: 92.8 FL — SIGNIFICANT CHANGE UP (ref 80–100)
MONOCYTES # BLD AUTO: 0.58 K/UL — SIGNIFICANT CHANGE UP (ref 0–0.9)
MONOCYTES NFR BLD AUTO: 6.6 % — SIGNIFICANT CHANGE UP (ref 2–14)
NEUTROPHILS # BLD AUTO: 5.44 K/UL — SIGNIFICANT CHANGE UP (ref 1.8–7.4)
NEUTROPHILS NFR BLD AUTO: 62 % — SIGNIFICANT CHANGE UP (ref 43–77)
PLATELET # BLD AUTO: 315 K/UL — SIGNIFICANT CHANGE UP (ref 150–400)
POTASSIUM SERPL-MCNC: 4.2 MMOL/L — SIGNIFICANT CHANGE UP (ref 3.5–5.3)
POTASSIUM SERPL-SCNC: 4.2 MMOL/L — SIGNIFICANT CHANGE UP (ref 3.5–5.3)
PROT SERPL-MCNC: 7.4 G/DL — SIGNIFICANT CHANGE UP (ref 6.6–8.7)
RBC # BLD: 5.16 M/UL — SIGNIFICANT CHANGE UP (ref 4.2–5.8)
RBC # FLD: 12.7 % — SIGNIFICANT CHANGE UP (ref 10.3–14.5)
SODIUM SERPL-SCNC: 137 MMOL/L — SIGNIFICANT CHANGE UP (ref 135–145)
WBC # BLD: 8.78 K/UL — SIGNIFICANT CHANGE UP (ref 3.8–10.5)
WBC # FLD AUTO: 8.78 K/UL — SIGNIFICANT CHANGE UP (ref 3.8–10.5)

## 2019-12-10 PROCEDURE — 93010 ELECTROCARDIOGRAM REPORT: CPT

## 2019-12-10 PROCEDURE — 36415 COLL VENOUS BLD VENIPUNCTURE: CPT

## 2019-12-10 PROCEDURE — 70450 CT HEAD/BRAIN W/O DYE: CPT | Mod: 26

## 2019-12-10 PROCEDURE — 70450 CT HEAD/BRAIN W/O DYE: CPT

## 2019-12-10 PROCEDURE — 96374 THER/PROPH/DIAG INJ IV PUSH: CPT

## 2019-12-10 PROCEDURE — 93005 ELECTROCARDIOGRAM TRACING: CPT

## 2019-12-10 PROCEDURE — 80053 COMPREHEN METABOLIC PANEL: CPT

## 2019-12-10 PROCEDURE — 85027 COMPLETE CBC AUTOMATED: CPT

## 2019-12-10 PROCEDURE — 99284 EMERGENCY DEPT VISIT MOD MDM: CPT

## 2019-12-10 PROCEDURE — 96375 TX/PRO/DX INJ NEW DRUG ADDON: CPT | Mod: 25

## 2019-12-10 RX ORDER — ACETAMINOPHEN 500 MG
650 TABLET ORAL ONCE
Refills: 0 | Status: COMPLETED | OUTPATIENT
Start: 2019-12-10 | End: 2019-12-10

## 2019-12-10 RX ORDER — LOSARTAN POTASSIUM 100 MG/1
1 TABLET, FILM COATED ORAL
Qty: 30 | Refills: 0
Start: 2019-12-10 | End: 2020-01-08

## 2019-12-10 RX ORDER — METOCLOPRAMIDE HCL 10 MG
10 TABLET ORAL ONCE
Refills: 0 | Status: COMPLETED | OUTPATIENT
Start: 2019-12-10 | End: 2019-12-10

## 2019-12-10 RX ORDER — METOCLOPRAMIDE HCL 10 MG
1 TABLET ORAL
Qty: 20 | Refills: 0
Start: 2019-12-10 | End: 2019-12-14

## 2019-12-10 RX ORDER — LOSARTAN POTASSIUM 100 MG/1
50 TABLET, FILM COATED ORAL ONCE
Refills: 0 | Status: COMPLETED | OUTPATIENT
Start: 2019-12-10 | End: 2019-12-10

## 2019-12-10 RX ORDER — DIPHENHYDRAMINE HCL 50 MG
25 CAPSULE ORAL ONCE
Refills: 0 | Status: COMPLETED | OUTPATIENT
Start: 2019-12-10 | End: 2019-12-10

## 2019-12-10 RX ADMIN — LOSARTAN POTASSIUM 50 MILLIGRAM(S): 100 TABLET, FILM COATED ORAL at 13:07

## 2019-12-10 RX ADMIN — Medication 10 MILLIGRAM(S): at 13:06

## 2019-12-10 RX ADMIN — Medication 650 MILLIGRAM(S): at 14:07

## 2019-12-10 RX ADMIN — Medication 650 MILLIGRAM(S): at 13:06

## 2019-12-10 RX ADMIN — Medication 25 MILLIGRAM(S): at 13:06

## 2019-12-10 NOTE — ED ADULT TRIAGE NOTE - CHIEF COMPLAINT QUOTE
Patient arrived ambulatory to ED, awake alert, and oriented times 3, breathing unlabored.  Patient complaining of head pain for 3 days.  Patient ran out of HTN medication 2 days ago.

## 2019-12-10 NOTE — ED STATDOCS - CARE PROVIDER_API CALL
Efra Sandra; PhD)  Clinical Neurophysiology; Neurology  [address] [place] [address]  Phone: [phone]  Fax: [phone]  Follow Up Time:

## 2019-12-10 NOTE — ED STATDOCS - CLINICAL SUMMARY MEDICAL DECISION MAKING FREE TEXT BOX
43 y/o M patient c/o HA. Differential includes migraine vs hypertensive HA. Will obtain labs, CT and give medication, most likely discharge.

## 2019-12-10 NOTE — ED STATDOCS - OBJECTIVE STATEMENT
43 y/o M pt with significant PMHx of HLD and HTN presents to the ED c/o left sided HA, onset 3 days ago. Associated with intermittent left sided blurry vision. He reports that he has been waking up today the past 3 days with the HA. His HA is similar to his previous migraines in the past. Patient normally takes 50 mg of Losartan, but hasn't in the past several days because he has run out. Denies CP, SOB, fever, chills. No further acute complaints at this time.

## 2019-12-10 NOTE — ED STATDOCS - ATTENDING CONTRIBUTION TO CARE
I, Yuri Traore, performed the initial face to face bedside interview with this patient regarding history of present illness, review of symptoms and relevant past medical, social and family history.  I completed an independent physical examination.  I was the initial provider who evaluated this patient. I have signed out the follow up of any pending tests (i.e. labs, radiological studies) to the ACP.  I have communicated the patient’s plan of care and disposition with the ACP.  The history, relevant review of systems, past medical and surgical history, medical decision making, and physical examination was documented by the scribe in my presence and I attest to the accuracy of the documentation.

## 2019-12-10 NOTE — ED ADULT NURSE NOTE - OBJECTIVE STATEMENT
Assumed care at 1330 pt co headache, high blood pressure, pt has not been able to take BP meds bc he ran out of refills for 3 days.

## 2019-12-10 NOTE — ED STATDOCS - PROGRESS NOTE DETAILS
HPI and intake orders and PE reviewed, labs unremarkable, elevated BP noted, ran out of medication, will re-check and check CT results and obtain UA patient re-assessed, CT WNL, reports headache completely resolved.  Pending UA results.  Will RX Losartan & Fiorcet and refer to neurology.

## 2019-12-26 ENCOUNTER — TRANSCRIPTION ENCOUNTER (OUTPATIENT)
Age: 44
End: 2019-12-26

## 2020-04-03 NOTE — PROGRESS NOTE ADULT - ASSESSMENT
Third attempt made to call patient with same recorded message as previous two calls. Will complete letter and mail it with a note added that clinic has not been able to reach patient.   45 yr old male w HLD, HTN came to ED c/o abdominal pain and blood per rectum    #Acute abdominal pain- colitis- Levaquin flagyl   # elevated lipase  #Blood per rectum FOBT neg x 1    cont  2. clear liquid diet  3. continue IVF  4. trend CBC  5. CT results pending  6. protonix 40 mg IV  7. will consider GI in am      #HLD  does not have script for statin  1. check lipids      #HTN-1. losartan

## 2021-12-08 NOTE — ED ADULT TRIAGE NOTE - HEIGHT IN FEET
Detail Level: Zone
Additional Notes: We will try to check into [facility].
Render Risk Assessment In Note?: no
6

## 2022-04-01 ENCOUNTER — APPOINTMENT (OUTPATIENT)
Dept: ORTHOPEDIC SURGERY | Facility: CLINIC | Age: 47
End: 2022-04-01
Payer: MEDICAID

## 2022-04-01 VITALS
HEART RATE: 94 BPM | DIASTOLIC BLOOD PRESSURE: 90 MMHG | SYSTOLIC BLOOD PRESSURE: 142 MMHG | WEIGHT: 250 LBS | HEIGHT: 73 IN | BODY MASS INDEX: 33.13 KG/M2

## 2022-04-01 DIAGNOSIS — M47.816 SPONDYLOSIS W/OUT MYELOPATHY OR RADICULOPATHY, LUMBAR REGION: ICD-10-CM

## 2022-04-01 DIAGNOSIS — M16.11 UNILATERAL PRIMARY OSTEOARTHRITIS, RIGHT HIP: ICD-10-CM

## 2022-04-01 DIAGNOSIS — M51.36 OTHER INTERVERTEBRAL DISC DEGENERATION, LUMBAR REGION: ICD-10-CM

## 2022-04-01 PROCEDURE — 99203 OFFICE O/P NEW LOW 30 MIN: CPT

## 2022-04-01 PROCEDURE — 72100 X-RAY EXAM L-S SPINE 2/3 VWS: CPT

## 2022-04-01 RX ORDER — NAPROXEN 500 MG/1
500 TABLET ORAL
Qty: 60 | Refills: 0 | Status: ACTIVE | COMMUNITY
Start: 2022-04-01 | End: 1900-01-01

## 2022-04-01 NOTE — PHYSICAL EXAM
[Obese] : obese [Poor Appearance] : well-appearing [Acute Distress] : not in acute distress [de-identified] : CONSTITUTIONAL: The patient is a very pleasant individual who is well-nourished and who appears stated age.\par PSYCHIATRIC: The patient is alert and oriented X 3 and in no apparent distress, and participates with orthopedic evaluation well.\par HEAD: Atraumatic and is nonsyndromic in appearance.\par EENT: No visible thyromegaly, EOMI.\par RESPIRATORY: Respiratory rate is regular, not dyspneic on examination.\par LYMPHATICS: There is no inguinal lymphadenopathy\par INTEGUMENTARY: Skin is clean, dry, and intact about the bilateral lower extremities and lumbar spine.\par VASCULAR: There is brisk capillary refill about the bilateral lower extremities.\par NEUROLOGIC: There are no pathologic reflexes. There is no decrease in sensation of the bilateral lower extremities on Wartenberg pinwheel examination. Deep tendon reflexes are well maintained at 2+/4 of the bilateral lower extremities and are symmetric.\par MUSCULOSKELETAL: There is no visible muscular atrophy. Manual motor strength is well maintained in the bilateral lower extremities. + tension sign and straight leg raise on the left. Quad extension, ankle dorsiflexion, EHL, plantar flexion, and ankle eversion are well preserved. Normal secondary orthopaedic exam of greater trochanteric area, knees and ankles. No pain with internal or external rotation of the bilateral hips. Mechanically oriented lower back pain. Groin pain with flexion and ER of the left hip  [de-identified] : AP and Lateral views of the lumbar spine taken 04/01/2022 demonstrates degenerative disc disease at L5-S1.

## 2022-04-01 NOTE — HISTORY OF PRESENT ILLNESS
[6] : an average pain level of 6/10 [8] : a maximum pain level of 8/10 [de-identified] : 47 year old M presents for an initial evaluation of lower back pain, mechanical in nature, for 20+ years and worsening at this time. He states he can not sleep due to pain. Pain radiates from the left buttock to the groin and down to the back of the left knee. No recent PT or chiropractics. He has injections 3 years ago with several months of relief. Exacerbating factors include activity and bending/ lifting. Pain is relief by antiinflammatories. \par \par Of note patient is a smoker and diabetic.  [Ataxia] : no ataxia [Incontinence] : no incontinence [Loss of Dexterity] : good dexterity [Urinary Ret.] : no urinary retention

## 2022-04-01 NOTE — DISCUSSION/SUMMARY
[de-identified] : We talked about the nature of the condition and treatment options. Anticipatory guidance regarding \par Patient was provided a Rx for Naproxen Sodium 500Mg BID. Avoid steroids secondary to diabetes. Patient is aware that this medication must be managed here on out by his PCP as we do not handle chronic prescriptions as a surgical practice.  Patient has been referred to physical therapy for decreased pain modalities, stretching and strengthening modalities, soft tissue modalities, and physical modalities. Patient has been referred to pain management for assessment regarding pain control. The patient will follow up in 4 - 6 weeks for a repeat clinical assessment, If pain persists at this point we will consider ordering an MRI to further assess these complaints. At next visit left hip images will be obtained. \par \par Prior to appointment and during encounter with patient extensive medical records were reviewed including but not limited to, hospital records, out patient records, imaging results, and lab data. During this appointment the patient was examined, diagnoses were discussed and explained in a face to face manner. In addition extensive time was spent reviewing aforementioned diagnostic studies. Counseling including abnormal image results, differential diagnoses, treatment options, risk and benefits, lifestyle changes, current condition, and current medications was performed. Patient's comments, questions, and concerns were address and patient verbalized understanding. Based on this patient's presentation at our office, which is an orthopedic spine surgeon's office, this patient inherently / intrinsically has a risk, however minute, of developing  issues such as Cauda equina syndrome, bowel and bladder changes, or progression of motor or neurological deficits such as paralysis which may be permanent. \par \par Patient is a smoker, patient was educated on the negative consequences of smoking and discontinuation of smoking was advised. Patient was advised that help with quitting smoking is available through [place] [employer] and phone number/website was provided, or patient can ask assistance from primary care provider. Elective surgery will not be performed unless patient complies with this policy.\par

## 2022-04-01 NOTE — ADDENDUM
[FreeTextEntry1] : Documented by Evangelista Sheridan acting as a scribe for Tamiko Ha on 04/01/2022 . All medical record entries made by the Scribe were at my, Tamiko Ha , direction and personally dictated by me on 04/01/2022 . I have reviewed the chart and agree that the record accurately reflects my personal performance of the history, physical exam, assessment and plan. I have also personally directed, reviewed, and agreed with the chart.

## 2023-06-02 NOTE — ED PROVIDER NOTE - PMH
I tried calling the number but received a different name- are you sure the number is correct? High cholesterol    History of hypertension

## 2023-08-29 NOTE — ED STATDOCS - NS_EDPROVIDERDISPOUSERTYPE_ED_A_ED
Scribe Attestation (For Scribes USE Only)... Attending Attestation (For Attendings USE Only).../Scribe Attestation (For Scribes USE Only)... Stelara Counseling:  I discussed with the patient the risks of ustekinumab including but not limited to immunosuppression, malignancy, posterior leukoencephalopathy syndrome, and serious infections.  The patient understands that monitoring is required including a PPD at baseline and must alert us or the primary physician if symptoms of infection or other concerning signs are noted.

## 2023-10-27 NOTE — ED ADULT TRIAGE NOTE - NS ED NURSE BANDS TYPE
Alert-The patient is alert, awake and responds to voice. The patient is oriented to time, place, and person. The triage nurse is able to obtain subjective information. Name band;

## 2024-03-05 ENCOUNTER — EMERGENCY (EMERGENCY)
Facility: HOSPITAL | Age: 49
LOS: 1 days | Discharge: HOME | End: 2024-03-05
Attending: STUDENT IN AN ORGANIZED HEALTH CARE EDUCATION/TRAINING PROGRAM
Payer: MEDICAID

## 2024-03-05 VITALS
SYSTOLIC BLOOD PRESSURE: 125 MMHG | DIASTOLIC BLOOD PRESSURE: 78 MMHG | OXYGEN SATURATION: 97 % | HEART RATE: 71 BPM | TEMPERATURE: 98 F | RESPIRATION RATE: 18 BRPM

## 2024-03-05 VITALS
WEIGHT: 246.92 LBS | HEIGHT: 73 IN | HEART RATE: 70 BPM | TEMPERATURE: 98 F | DIASTOLIC BLOOD PRESSURE: 76 MMHG | RESPIRATION RATE: 18 BRPM | OXYGEN SATURATION: 99 % | SYSTOLIC BLOOD PRESSURE: 144 MMHG

## 2024-03-05 DIAGNOSIS — T14.90 INJURY, UNSPECIFIED: Chronic | ICD-10-CM

## 2024-03-05 DIAGNOSIS — Z90.49 ACQUIRED ABSENCE OF OTHER SPECIFIED PARTS OF DIGESTIVE TRACT: Chronic | ICD-10-CM

## 2024-03-05 PROCEDURE — 72131 CT LUMBAR SPINE W/O DYE: CPT | Mod: MC

## 2024-03-05 PROCEDURE — 72131 CT LUMBAR SPINE W/O DYE: CPT | Mod: 26,MC

## 2024-03-05 PROCEDURE — 99284 EMERGENCY DEPT VISIT MOD MDM: CPT

## 2024-03-05 PROCEDURE — 96372 THER/PROPH/DIAG INJ SC/IM: CPT

## 2024-03-05 RX ORDER — IBUPROFEN 200 MG
1 TABLET ORAL
Qty: 21 | Refills: 0
Start: 2024-03-05 | End: 2024-03-11

## 2024-03-05 RX ORDER — LIDOCAINE 4 G/100G
1 CREAM TOPICAL ONCE
Refills: 0 | Status: COMPLETED | OUTPATIENT
Start: 2024-03-05 | End: 2024-03-05

## 2024-03-05 RX ORDER — METHOCARBAMOL 500 MG/1
2 TABLET, FILM COATED ORAL
Qty: 18 | Refills: 0
Start: 2024-03-05 | End: 2024-03-07

## 2024-03-05 RX ORDER — ACETAMINOPHEN 500 MG
975 TABLET ORAL ONCE
Refills: 0 | Status: COMPLETED | OUTPATIENT
Start: 2024-03-05 | End: 2024-03-05

## 2024-03-05 RX ORDER — METHOCARBAMOL 500 MG/1
750 TABLET, FILM COATED ORAL ONCE
Refills: 0 | Status: COMPLETED | OUTPATIENT
Start: 2024-03-05 | End: 2024-03-05

## 2024-03-05 RX ORDER — LIDOCAINE 4 G/100G
1 CREAM TOPICAL
Qty: 1 | Refills: 0
Start: 2024-03-05 | End: 2024-03-09

## 2024-03-05 RX ORDER — KETOROLAC TROMETHAMINE 30 MG/ML
30 SYRINGE (ML) INJECTION ONCE
Refills: 0 | Status: DISCONTINUED | OUTPATIENT
Start: 2024-03-05 | End: 2024-03-05

## 2024-03-05 RX ADMIN — METHOCARBAMOL 750 MILLIGRAM(S): 500 TABLET, FILM COATED ORAL at 18:36

## 2024-03-05 RX ADMIN — Medication 30 MILLIGRAM(S): at 18:33

## 2024-03-05 RX ADMIN — LIDOCAINE 1 PATCH: 4 CREAM TOPICAL at 18:33

## 2024-03-05 RX ADMIN — Medication 975 MILLIGRAM(S): at 18:32

## 2024-03-05 NOTE — ED PROVIDER NOTE - ATTENDING CONTRIBUTION TO CARE
I, Shiva Armijo MD, personally saw the patient with the resident, and completed the key components of the history and physical exam. I then discussed the management plan with the resident.  Patient with past medical history of hypertension, hyperlipidemia, diabetes, chronic back pain is presenting with concern for back pain.  States earlier today he was trying to  a large cushion he bent over and felt a crack in his lower back.  At that time he endorsed pain shooting down both of his legs.  Denies any numbness or tingling.  No episodes of incontinence over the course of the day today.  States feels more severe compared to prior episodes of back pain.  On exam here he has lower lumbar spine tenderness palpation.  He is able to lift both legs off the bed but does endorse pain with this.  No sensory deficits to the lower extremities.  History and exam likely consistent with musculoskeletal pain.  However with his history of chronic back pain and midline tenderness, will check CT imaging for fracture and trauma.  History and exam is not consistent with spinal cord pathology given he has no weakness or incontinence or sensory deficits.  Plan on imaging, pain control.

## 2024-03-05 NOTE — ED PROVIDER NOTE - OBJECTIVE STATEMENT
49y M w/ PMH of chronic back pain 2/2 herniated disc (unsure which level), HTN, HLD, and T2DM presenting with acute on chronic back pain. Pt reports this morning he was moving furniture when he bent down, heard a crack, and started with sharp midline lower back pain. Pain radiates across his back and down bilateral legs. Pt also reports tingling down both legs. Endorses difficulty walking due to pain. He has not taken anything for pain at home. Denies numbness, weakness, incontinence.

## 2024-03-05 NOTE — ED PROVIDER NOTE - NSICDXPASTMEDICALHX_GEN_ALL_CORE_FT
PAST MEDICAL HISTORY:  Chronic back pain     High cholesterol     History of hypertension     Type 2 diabetes mellitus

## 2024-03-05 NOTE — ED ADULT NURSE NOTE - NS ED PATIENT SAFETY CONCERN
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   
Using sertraline 100 yr round.   Continue.   
abnl spirometry so I do think her symptoms are coming from the paralysis  Symptoms are improving  Seeing pulm in a month  Recheck CXR before that appt  May resolve on it's own or may be accommodating to the abnormality   
No

## 2024-03-05 NOTE — ED ADULT NURSE NOTE - OBJECTIVE STATEMENT
pt was lifting a piece of furniture when he heard a crack in his lower back and starts having lower back pain. Pt is unable to walk, sit or ambulate without pain. hx chronic back pain. AO4, NAD.

## 2024-03-05 NOTE — ED PROVIDER NOTE - PLAN OF CARE
49y with history of chronic back pain presenting with acute onset lumbar back pain with BLE pain and tingling raising suspicion for acute lumbar injury. Plan for pain control, CT lumbar spine, and reassessment

## 2024-03-05 NOTE — ED PROVIDER NOTE - CARE PROVIDER_API CALL
Alanna Bonds  Neurosurgery  [address] [place] [id number]  Phone: [phone]  Fax: [phone]  Follow Up Time:

## 2024-03-05 NOTE — ED ADULT NURSE NOTE - CAS TRG GENERAL AIRWAY, MLM
What Type Of Note Output Would You Prefer (Optional)?: Bullet Format
Hpi Title: Evaluation of Skin Lesions
How Severe Are Your Spot(S)?: mild
Have Your Spot(S) Been Treated In The Past?: has not been treated
Patent

## 2024-03-05 NOTE — ED PROVIDER NOTE - CLINICAL SUMMARY MEDICAL DECISION MAKING FREE TEXT BOX
pt with acute on chronic back pain x 12 hours. pt states he was lifting big couch pillow when he felt sharp pain  CT without acute changes  pt neurologically intact, no weakness, numbness, deficits.   given spine follow up    Pt reassessed, pt feeling better at this time, vss, pt able to walk, talk and vocalized plan of action. Discussed in depth and explained to pt in depth the next steps that need to be taken including proper follow up with PCP or specialists. All incidental findings were discussed with pt as well. Pt verbalized their concerns and all questions were answered. Pt understands dispo and wants discharge. Given good instructions when to return to ED, strict return precautions and importance of f/u.

## 2024-03-05 NOTE — ED ADULT NURSE NOTE - NS ED NOTE  TALK SOMEONE YN
Patient requesting to urinate and get out of restraints. RN educated patient on external catheter use. Patient then requests fentanyl \"because my life is over and it will just put me to sleep so I wont wake up again\".    No

## 2024-03-05 NOTE — ED PROVIDER NOTE - PATIENT PORTAL LINK FT
You can access the FollowMyHealth Patient Portal offered by [facility] by registering at the following website: [email or web address]. By joining [employer]’s FollowMyHealth portal, you will also be able to view your health information using other applications (apps) compatible with our system.

## 2024-03-05 NOTE — ED ADULT NURSE NOTE - NS ED NURSE DISCH DISPOSITION
Discharged

## 2024-03-05 NOTE — ED PROVIDER NOTE - CARE PLAN
Assessment and plan of treatment:	49y with history of chronic back pain presenting with acute onset lumbar back pain with BLE pain and tingling raising suspicion for acute lumbar injury. Plan for pain control, CT lumbar spine, and reassessment   Principal Discharge DX:	Back pain  Assessment and plan of treatment:	49y with history of chronic back pain presenting with acute onset lumbar back pain with BLE pain and tingling raising suspicion for acute lumbar injury. Plan for pain control, CT lumbar spine, and reassessment   1

## 2024-05-15 NOTE — ED ADULT TRIAGE NOTE - AS HEIGHT TYPE
Introduction Text (Please End With A Colon): The following procedure was deferred: X Size Of Lesion In Cm (Optional): 1 Detail Level: Detailed stated

## 2024-09-16 ENCOUNTER — EMERGENCY (EMERGENCY)
Facility: HOSPITAL | Age: 49
LOS: 1 days | End: 2024-09-16
Attending: STUDENT IN AN ORGANIZED HEALTH CARE EDUCATION/TRAINING PROGRAM
Payer: SELF-PAY

## 2024-09-16 VITALS
RESPIRATION RATE: 18 BRPM | DIASTOLIC BLOOD PRESSURE: 91 MMHG | OXYGEN SATURATION: 99 % | HEART RATE: 78 BPM | SYSTOLIC BLOOD PRESSURE: 152 MMHG | TEMPERATURE: 98 F

## 2024-09-16 VITALS
WEIGHT: 235.89 LBS | TEMPERATURE: 98 F | RESPIRATION RATE: 20 BRPM | OXYGEN SATURATION: 98 % | DIASTOLIC BLOOD PRESSURE: 98 MMHG | HEIGHT: 73 IN | HEART RATE: 78 BPM | SYSTOLIC BLOOD PRESSURE: 161 MMHG

## 2024-09-16 DIAGNOSIS — Z90.49 ACQUIRED ABSENCE OF OTHER SPECIFIED PARTS OF DIGESTIVE TRACT: Chronic | ICD-10-CM

## 2024-09-16 DIAGNOSIS — T14.90 INJURY, UNSPECIFIED: Chronic | ICD-10-CM

## 2024-09-16 PROBLEM — M54.9 DORSALGIA, UNSPECIFIED: Chronic | Status: ACTIVE | Noted: 2024-03-05

## 2024-09-16 PROBLEM — E11.9 TYPE 2 DIABETES MELLITUS WITHOUT COMPLICATIONS: Chronic | Status: ACTIVE | Noted: 2024-03-05

## 2024-09-16 PROCEDURE — 99284 EMERGENCY DEPT VISIT MOD MDM: CPT

## 2024-09-16 PROCEDURE — 99283 EMERGENCY DEPT VISIT LOW MDM: CPT

## 2024-09-16 RX ORDER — METHOCARBAMOL 750 MG/1
2 TABLET, FILM COATED ORAL
Qty: 40 | Refills: 0
Start: 2024-09-16 | End: 2024-09-20

## 2024-09-16 RX ORDER — METHOCARBAMOL 750 MG/1
1500 TABLET, FILM COATED ORAL ONCE
Refills: 0 | Status: COMPLETED | OUTPATIENT
Start: 2024-09-16 | End: 2024-09-16

## 2024-09-16 RX ORDER — IBUPROFEN 600 MG
1 TABLET ORAL
Qty: 20 | Refills: 0
Start: 2024-09-16 | End: 2024-09-20

## 2024-09-16 RX ORDER — IBUPROFEN 600 MG
600 TABLET ORAL ONCE
Refills: 0 | Status: COMPLETED | OUTPATIENT
Start: 2024-09-16 | End: 2024-09-16

## 2024-09-16 RX ADMIN — METHOCARBAMOL 1500 MILLIGRAM(S): 750 TABLET, FILM COATED ORAL at 17:02

## 2024-09-16 RX ADMIN — Medication 600 MILLIGRAM(S): at 17:02
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
## 2024-09-16 NOTE — ED PROVIDER NOTE - PATIENT PORTAL LINK FT
Changed to fosamax   You can access the FollowMyHealth Patient Portal offered by [facility] by registering at the following website: [email or web address]. By joining [employer]’s FollowMyHealth portal, you will also be able to view your health information using other applications (apps) compatible with our system.

## 2024-09-16 NOTE — ED PROVIDER NOTE - PHYSICAL EXAMINATION
General: Awake, alert, well appearing  HEENT: EOMI. No scleral icterus or conjunctival injection. Moist mucous membranes.   Neck:. Soft and supple. Trachea midline  Cardiac: Extremities warm and well perfused. No LE edema.  Resp: No respiratory distress or accessory muscle use.   Abd: Soft, non-distended. No overlying skin changes  Skin: No rashes, abrasions, or lacerations.  MSK: Bilateral trapezius tenderness and spasm. No chest wall tenderness. No midline spinal tenderness. Extremities full ROM without pain  Neuro: AO x 4. Moves all extremities symmetrically. Motor strength and sensation grossly intact.  Psych: Appropriate mood and affect

## 2025-05-01 NOTE — ED STATDOCS - TOBACCO USE
The medication list included in this document is our record of what you are currently taking, including any changes that were made at today's visit.  If you find any differences when compared to your medications at home, or have any questions that were not answered at your visit, please contact the office.Keep a list of your medicines with you. List all of the prescription medicines, nonprescription medicines, supplements, natural remedies, and vitamins that you take. Tell your healthcare providers who treat you about all of the products you are taking. Your provider can provide you with a form to keep track of them. Just ask.  Follow the directions that come with your medicine, including information about food or alcohol. Make sure you know how and when to take your medicine. Do not take more or less than you are supposed to take.  Keep all medicines out of the reach of children.  Store medicines according to the directions on the label.  Monitor yourself. Learn to know how your body reacts to your new medicine and keep track of how it makes you feel before attempting (If your provider has allowed you to do so) to drive or go to work.   Seek emergency medical attention if you think you have used too much of this medicine. An overdose of any prescription medicine can be fatal. Overdose symptoms may include extreme drowsiness, muscle weakness, confusion, cold and clammy skin, pinpoint pupils, shallow breathing, slow heart rate, fainting, or coma.  Don't share prescription medicines with others, even when they seem to have the same symptoms. What may be good for you may be harmful to others.  If you are no longer taking a prescribed medication and you have pills left please take your pills out of their original containers. Mix crushed pills with an undesirable substance, such as cat litter or used coffee grounds. Put the mixture into a disposable container with a lid, such as an empty margarine tub, or into a sealable  Current some day smoker